# Patient Record
Sex: FEMALE | Race: WHITE | Employment: UNEMPLOYED | URBAN - NONMETROPOLITAN AREA
[De-identification: names, ages, dates, MRNs, and addresses within clinical notes are randomized per-mention and may not be internally consistent; named-entity substitution may affect disease eponyms.]

---

## 2022-04-22 ENCOUNTER — HOSPITAL ENCOUNTER (EMERGENCY)
Age: 22
Discharge: HOME | End: 2022-04-23
Attending: EMERGENCY MEDICINE

## 2022-04-22 DIAGNOSIS — K80.20 CALCULUS OF GALLBLADDER WITHOUT CHOLECYSTITIS WITHOUT OBSTRUCTION: Primary | ICD-10-CM

## 2022-04-22 DIAGNOSIS — R10.11 ABDOMINAL PAIN, RIGHT UPPER QUADRANT: ICD-10-CM

## 2022-04-22 LAB
ABSOLUTE EOS #: 0.33 K/UL (ref 0–0.44)
ABSOLUTE IMMATURE GRANULOCYTE: <0.03 K/UL (ref 0–0.3)
ABSOLUTE LYMPH #: 2.03 K/UL (ref 1.1–3.7)
ABSOLUTE MONO #: 0.59 K/UL (ref 0.1–1.2)
ALBUMIN SERPL-MCNC: 4.6 G/DL (ref 3.5–5.2)
ALBUMIN/GLOBULIN RATIO: 1.5 (ref 1–2.5)
ALP BLD-CCNC: 70 U/L (ref 35–104)
ALT SERPL-CCNC: <5 U/L (ref 5–33)
ANION GAP SERPL CALCULATED.3IONS-SCNC: 11 MMOL/L (ref 9–17)
AST SERPL-CCNC: 20 U/L
BASOPHILS # BLD: 0 % (ref 0–2)
BASOPHILS ABSOLUTE: 0.03 K/UL (ref 0–0.2)
BILIRUB SERPL-MCNC: 0.27 MG/DL (ref 0.3–1.2)
BUN BLDV-MCNC: 12 MG/DL (ref 6–20)
BUN/CREAT BLD: 25 (ref 9–20)
CALCIUM SERPL-MCNC: 9.1 MG/DL (ref 8.6–10.4)
CHLORIDE BLD-SCNC: 106 MMOL/L (ref 98–107)
CO2: 22 MMOL/L (ref 20–31)
CREAT SERPL-MCNC: 0.48 MG/DL (ref 0.5–0.9)
D-DIMER QUANTITATIVE: 1.1 MG/L FEU (ref 0–0.59)
EOSINOPHILS RELATIVE PERCENT: 4 % (ref 1–4)
GFR AFRICAN AMERICAN: >60 ML/MIN
GFR NON-AFRICAN AMERICAN: >60 ML/MIN
GFR SERPL CREATININE-BSD FRML MDRD: ABNORMAL ML/MIN/{1.73_M2}
GLUCOSE BLD-MCNC: 107 MG/DL (ref 70–99)
HCT VFR BLD CALC: 37.3 % (ref 36.3–47.1)
HEMOGLOBIN: 12.6 G/DL (ref 11.9–15.1)
IMMATURE GRANULOCYTES: 0 %
LIPASE: 20 U/L (ref 13–60)
LYMPHOCYTES # BLD: 25 % (ref 24–43)
MCH RBC QN AUTO: 27.8 PG (ref 25.2–33.5)
MCHC RBC AUTO-ENTMCNC: 33.8 G/DL (ref 25.2–33.5)
MCV RBC AUTO: 82.3 FL (ref 82.6–102.9)
MONOCYTES # BLD: 7 % (ref 3–12)
NRBC AUTOMATED: 0 PER 100 WBC
PDW BLD-RTO: 13.1 % (ref 11.8–14.4)
PLATELET # BLD: 208 K/UL (ref 138–453)
PMV BLD AUTO: 10.7 FL (ref 8.1–13.5)
POTASSIUM SERPL-SCNC: 3.6 MMOL/L (ref 3.7–5.3)
RBC # BLD: 4.53 M/UL (ref 3.95–5.11)
RBC # BLD: ABNORMAL 10*6/UL
SEG NEUTROPHILS: 64 % (ref 36–65)
SEGMENTED NEUTROPHILS ABSOLUTE COUNT: 4.99 K/UL (ref 1.5–8.1)
SODIUM BLD-SCNC: 139 MMOL/L (ref 135–144)
TOTAL PROTEIN: 7.7 G/DL (ref 6.4–8.3)
TROPONIN, HIGH SENSITIVITY: <6 NG/L (ref 0–14)
WBC # BLD: 8 K/UL (ref 3.5–11.3)

## 2022-04-22 PROCEDURE — 80053 COMPREHEN METABOLIC PANEL: CPT

## 2022-04-22 PROCEDURE — 96375 TX/PRO/DX INJ NEW DRUG ADDON: CPT

## 2022-04-22 PROCEDURE — 6370000000 HC RX 637 (ALT 250 FOR IP): Performed by: EMERGENCY MEDICINE

## 2022-04-22 PROCEDURE — 36415 COLL VENOUS BLD VENIPUNCTURE: CPT

## 2022-04-22 PROCEDURE — 96374 THER/PROPH/DIAG INJ IV PUSH: CPT

## 2022-04-22 PROCEDURE — 93005 ELECTROCARDIOGRAM TRACING: CPT | Performed by: EMERGENCY MEDICINE

## 2022-04-22 PROCEDURE — 6360000002 HC RX W HCPCS: Performed by: EMERGENCY MEDICINE

## 2022-04-22 PROCEDURE — 85379 FIBRIN DEGRADATION QUANT: CPT

## 2022-04-22 PROCEDURE — 81001 URINALYSIS AUTO W/SCOPE: CPT

## 2022-04-22 PROCEDURE — 81025 URINE PREGNANCY TEST: CPT

## 2022-04-22 PROCEDURE — 85025 COMPLETE CBC W/AUTO DIFF WBC: CPT

## 2022-04-22 PROCEDURE — 2580000003 HC RX 258: Performed by: EMERGENCY MEDICINE

## 2022-04-22 PROCEDURE — 99285 EMERGENCY DEPT VISIT HI MDM: CPT

## 2022-04-22 PROCEDURE — 83690 ASSAY OF LIPASE: CPT

## 2022-04-22 PROCEDURE — 84484 ASSAY OF TROPONIN QUANT: CPT

## 2022-04-22 RX ORDER — KETOROLAC TROMETHAMINE 30 MG/ML
15 INJECTION, SOLUTION INTRAMUSCULAR; INTRAVENOUS ONCE
Status: COMPLETED | OUTPATIENT
Start: 2022-04-22 | End: 2022-04-22

## 2022-04-22 RX ORDER — ONDANSETRON 2 MG/ML
4 INJECTION INTRAMUSCULAR; INTRAVENOUS ONCE
Status: COMPLETED | OUTPATIENT
Start: 2022-04-22 | End: 2022-04-22

## 2022-04-22 RX ORDER — 0.9 % SODIUM CHLORIDE 0.9 %
1000 INTRAVENOUS SOLUTION INTRAVENOUS ONCE
Status: COMPLETED | OUTPATIENT
Start: 2022-04-22 | End: 2022-04-23

## 2022-04-22 RX ADMIN — ONDANSETRON 4 MG: 2 INJECTION INTRAMUSCULAR; INTRAVENOUS at 23:15

## 2022-04-22 RX ADMIN — SODIUM CHLORIDE 1000 ML: 9 INJECTION, SOLUTION INTRAVENOUS at 23:14

## 2022-04-22 RX ADMIN — KETOROLAC TROMETHAMINE 15 MG: 30 INJECTION, SOLUTION INTRAMUSCULAR at 23:15

## 2022-04-22 RX ADMIN — LIDOCAINE HYDROCHLORIDE: 20 SOLUTION ORAL; TOPICAL at 23:35

## 2022-04-22 ASSESSMENT — PAIN DESCRIPTION - DESCRIPTORS: DESCRIPTORS: TIGHTNESS

## 2022-04-22 ASSESSMENT — PAIN - FUNCTIONAL ASSESSMENT: PAIN_FUNCTIONAL_ASSESSMENT: 0-10

## 2022-04-22 ASSESSMENT — PAIN SCALES - GENERAL
PAINLEVEL_OUTOF10: 10
PAINLEVEL_OUTOF10: 9

## 2022-04-22 ASSESSMENT — PAIN DESCRIPTION - LOCATION: LOCATION: ABDOMEN;CHEST

## 2022-04-23 ENCOUNTER — APPOINTMENT (OUTPATIENT)
Dept: CT IMAGING | Age: 22
End: 2022-04-23

## 2022-04-23 VITALS
BODY MASS INDEX: 36.32 KG/M2 | OXYGEN SATURATION: 98 % | HEART RATE: 70 BPM | SYSTOLIC BLOOD PRESSURE: 154 MMHG | HEIGHT: 60 IN | WEIGHT: 185 LBS | DIASTOLIC BLOOD PRESSURE: 94 MMHG | RESPIRATION RATE: 16 BRPM | TEMPERATURE: 99 F

## 2022-04-23 LAB
-: ABNORMAL
BACTERIA: ABNORMAL
BILIRUBIN URINE: NEGATIVE
EPITHELIAL CELLS UA: ABNORMAL /HPF (ref 0–5)
GLUCOSE URINE: NEGATIVE
HCG(URINE) PREGNANCY TEST: NEGATIVE
KETONES, URINE: NEGATIVE
LEUKOCYTE ESTERASE, URINE: ABNORMAL
NITRITE, URINE: NEGATIVE
PH UA: 7 (ref 5–6)
PROTEIN UA: NEGATIVE
RBC UA: ABNORMAL /HPF (ref 0–4)
SPECIFIC GRAVITY UA: 1.02 (ref 1.01–1.02)
URINE HGB: ABNORMAL
UROBILINOGEN, URINE: NORMAL
WBC UA: ABNORMAL /HPF (ref 0–4)

## 2022-04-23 PROCEDURE — 71260 CT THORAX DX C+: CPT

## 2022-04-23 PROCEDURE — 6360000004 HC RX CONTRAST MEDICATION: Performed by: EMERGENCY MEDICINE

## 2022-04-23 PROCEDURE — 2709999900 CT ABDOMEN PELVIS W IV CONTRAST

## 2022-04-23 RX ADMIN — IOPAMIDOL 100 ML: 755 INJECTION, SOLUTION INTRAVENOUS at 00:59

## 2022-04-23 ASSESSMENT — ENCOUNTER SYMPTOMS
VOMITING: 0
NAUSEA: 1
ABDOMINAL PAIN: 1
SHORTNESS OF BREATH: 0

## 2022-04-23 ASSESSMENT — PAIN DESCRIPTION - LOCATION: LOCATION: ABDOMEN

## 2022-04-23 ASSESSMENT — PAIN SCALES - GENERAL
PAINLEVEL_OUTOF10: 3
PAINLEVEL_OUTOF10: 1

## 2022-04-23 ASSESSMENT — PAIN - FUNCTIONAL ASSESSMENT: PAIN_FUNCTIONAL_ASSESSMENT: 0-10

## 2022-04-23 ASSESSMENT — PAIN DESCRIPTION - DESCRIPTORS: DESCRIPTORS: TIGHTNESS

## 2022-04-23 ASSESSMENT — PAIN DESCRIPTION - ORIENTATION: ORIENTATION: RIGHT

## 2022-04-23 NOTE — ED PROVIDER NOTES
[facility]  [address]  Phone: [phone]  eMERGENCY dEPARTMENT eNCOUnter      Pt Name: Rozina Pierce  MRN: [id number]  [place] 2000  Date of evaluation: 4/23/22      CHIEF COMPLAINT     Chief Complaint   Patient presents with    Chest Pain    Abdominal Pain       HISTORY OF PRESENT ILLNESS    Rozina Pierce is a 25 y.o. female who presents today for abdominal pain and chest pain. Patient indicates when she woke up this morning she did not feel quite herself. She states that at approximately 3:30 in the afternoon she developed significant abdominal pain shortly after eating. She did go home and was able to have a bowel movement and felt somewhat better and then she had an exacerbation of symptoms as well as development of right-sided chest pain started 2 hours ago. She describes it is in the mid to right chest.  She also has associated epigastric pain. She describes the symptoms as sharp and tight. No associated cough or shortness of breath. Denies OCPs. No previous abdominal surgeries. Denies other hormones. No recent surgeries prolonged travel calf tenderness or swelling. Patient does indicate a family history of gallbladder disease. No family history of premature cardiovascular disease. REVIEW OF SYSTEMS     Review of Systems   Constitutional: Negative for fever. HENT: Negative for congestion. Respiratory: Negative for shortness of breath. Cardiovascular: Positive for chest pain. Gastrointestinal: Positive for abdominal pain and nausea. Negative for vomiting. Genitourinary: Negative for dysuria. Skin: Negative for rash. Neurological: Negative for syncope. Psychiatric/Behavioral: Negative for confusion. All other systems reviewed and are negative. PAST MEDICAL HISTORY    has no past medical history on file. SURGICAL HISTORY      has no past surgical history on file.     CURRENT MEDICATIONS     There are no discharge medications for this patient. ALLERGIES     has No Known Allergies. FAMILY HISTORY     has no family status information on file. family history is not on file. SOCIAL HISTORY      reports that she has never smoked. She has never used smokeless tobacco.    PHYSICAL EXAM     INITIAL VITALS:  height is 5' (1.524 m) and weight is 185 lb (83.9 kg). Her tympanic temperature is 99 °F (37.2 °C). Her blood pressure is 154/94 (abnormal) and her pulse is 70. Her respiration is 16 and oxygen saturation is 98%. Physical Exam  Vitals reviewed. Constitutional:       General: She is in acute distress. Appearance: She is not ill-appearing, toxic-appearing or diaphoretic. HENT:      Head: Normocephalic and atraumatic. Eyes:      Extraocular Movements: Extraocular movements intact. Pupils: Pupils are equal, round, and reactive to light. Cardiovascular:      Rate and Rhythm: Normal rate and regular rhythm. Heart sounds: Normal heart sounds. No murmur heard. Comments: Heart rate high normal.  Pulmonary:      Effort: Pulmonary effort is normal. No respiratory distress. Breath sounds: Normal breath sounds. Abdominal:      Tenderness: There is abdominal tenderness in the right upper quadrant. There is no guarding or rebound. Positive signs include George's sign. Negative signs include McBurney's sign. Skin:     General: Skin is warm and dry. Capillary Refill: Capillary refill takes less than 2 seconds. Findings: No rash. Neurological:      General: No focal deficit present. Mental Status: She is alert and oriented to person, place, and time. Psychiatric:         Mood and Affect: Mood normal.         Behavior: Behavior normal.       DIFFERENTIAL DIAGNOSIS / MDM / EMERGENCY DEPARTMENT COURSE:     Given patient's symptoms and heart rate I will obtain a D-dimer to screen for pulmonary embolism however patient symptoms may also be abdominal in origin.   Will obtain intervals normal axis. No acute ST elevations or depressions but nonspecific ST-T wave changes. Interpretation is a nonacute twelve-lead EKG. RADIOLOGY:   Radiologist interpretation of radiologic studies:     CT ABDOMEN PELVIS W IV CONTRAST Additional Contrast? None    Result Date: 4/23/2022  EXAMINATION: CTA OF THE CHEST; CT OF THE ABDOMEN AND PELVIS WITH CONTRAST 4/23/2022 12:51 am TECHNIQUE: CTA of the chest was performed after the administration of intravenous contrast.  Multiplanar reformatted images are provided for review. MIP images are provided for review. Dose modulation, iterative reconstruction, and/or weight based adjustment of the mA/kV was utilized to reduce the radiation dose to as low as reasonably achievable.; CT of the abdomen and pelvis was performed with the administration of intravenous contrast. Multiplanar reformatted images are provided for review. Dose modulation, iterative reconstruction, and/or weight based adjustment of the mA/kV was utilized to reduce the radiation dose to as low as reasonably achievable. COMPARISON: None. HISTORY: ORDERING SYSTEM PROVIDED HISTORY: chest pain / abd pain / elevated dimer TECHNOLOGIST PROVIDED HISTORY: chest pain / abd pain / elevated dimer Decision Support Exception - unselect if not a suspected or confirmed emergency medical condition->Emergency Medical Condition (MA) Reason for Exam: elevated ddimer,  mid abd pain FINDINGS: Chest: Pulmonary Arteries: Pulmonary arteries are adequately opacified for evaluation. No evidence of intraluminal filling defect to suggest pulmonary embolism. Main pulmonary artery is normal in caliber. Mediastinum: No evidence of mediastinal lymphadenopathy. The heart and pericardium demonstrate no acute abnormality. There is no acute abnormality of the thoracic aorta. Lungs/pleura: The lungs are without acute process. No focal consolidation or pulmonary edema. Minimal bibasilar dependent atelectasis.   No evidence of pleural effusion or pneumothorax. Soft Tissues/Bones: No acute osseous abnormality in the thoracic cage. Abdomen pelvis: Organs: Multiple stones in the gallbladder in, including the neck. The largest stone measures 1.2 cm. Gallbladder wall edema is seen. None unremarkable liver, spleen, pancreas, adrenals, and left kidney. A 1.1 cm simple cyst in the lateral cortex of the right kidney, likely a simple cyst but too small to fully characterize. GI/Bowel: Normal appendix. Bowel loops nonobstructed. Pelvis: Low lying and rotated IUD which is imbedded in the myometrium of the lower uterine segment. No adnexal mass. The urinary bladder grossly intact. Peritoneum/Retroperitoneum: No free air or free fluid. No adenopathy. Intact abdominal aorta and its major branches. Bones/Soft Tissues: No acute osseous abnormality. No evidence of pulmonary embolism or acute pulmonary abnormality. Cholelithiasis with gallbladder wall edema. HIDA scan may be obtained to evaluate for possible acute cholecystitis if clinically indicated. Low lying and rotated IUD which is imbedded in the myometrium of the lower uterine segment. Clinical correlation recommended. CT CHEST PULMONARY EMBOLISM W CONTRAST    Result Date: 4/23/2022  EXAMINATION: CTA OF THE CHEST; CT OF THE ABDOMEN AND PELVIS WITH CONTRAST 4/23/2022 12:51 am TECHNIQUE: CTA of the chest was performed after the administration of intravenous contrast.  Multiplanar reformatted images are provided for review. MIP images are provided for review. Dose modulation, iterative reconstruction, and/or weight based adjustment of the mA/kV was utilized to reduce the radiation dose to as low as reasonably achievable.; CT of the abdomen and pelvis was performed with the administration of intravenous contrast. Multiplanar reformatted images are provided for review.  Dose modulation, iterative reconstruction, and/or weight based adjustment of the mA/kV was utilized to reduce the radiation dose to as low as reasonably achievable. COMPARISON: None. HISTORY: ORDERING SYSTEM PROVIDED HISTORY: chest pain / abd pain / elevated dimer TECHNOLOGIST PROVIDED HISTORY: chest pain / abd pain / elevated dimer Decision Support Exception - unselect if not a suspected or confirmed emergency medical condition->Emergency Medical Condition (MA) Reason for Exam: elevated ddimer,  mid abd pain FINDINGS: Chest: Pulmonary Arteries: Pulmonary arteries are adequately opacified for evaluation. No evidence of intraluminal filling defect to suggest pulmonary embolism. Main pulmonary artery is normal in caliber. Mediastinum: No evidence of mediastinal lymphadenopathy. The heart and pericardium demonstrate no acute abnormality. There is no acute abnormality of the thoracic aorta. Lungs/pleura: The lungs are without acute process. No focal consolidation or pulmonary edema. Minimal bibasilar dependent atelectasis. No evidence of pleural effusion or pneumothorax. Soft Tissues/Bones: No acute osseous abnormality in the thoracic cage. Abdomen pelvis: Organs: Multiple stones in the gallbladder in, including the neck. The largest stone measures 1.2 cm. Gallbladder wall edema is seen. None unremarkable liver, spleen, pancreas, adrenals, and left kidney. A 1.1 cm simple cyst in the lateral cortex of the right kidney, likely a simple cyst but too small to fully characterize. GI/Bowel: Normal appendix. Bowel loops nonobstructed. Pelvis: Low lying and rotated IUD which is imbedded in the myometrium of the lower uterine segment. No adnexal mass. The urinary bladder grossly intact. Peritoneum/Retroperitoneum: No free air or free fluid. No adenopathy. Intact abdominal aorta and its major branches. Bones/Soft Tissues: No acute osseous abnormality. No evidence of pulmonary embolism or acute pulmonary abnormality. Cholelithiasis with gallbladder wall edema.   HIDA scan may be obtained to evaluate for possible acute cholecystitis if clinically indicated. Low lying and rotated IUD which is imbedded in the myometrium of the lower uterine segment. Clinical correlation recommended.        LABS:  Results for orders placed or performed during the hospital encounter of 04/22/22   CBC with Auto Differential   Result Value Ref Range    WBC 8.0 3.5 - 11.3 k/uL    RBC 4.53 3.95 - 5.11 m/uL    Hemoglobin 12.6 11.9 - 15.1 g/dL    Hematocrit 37.3 36.3 - 47.1 %    MCV 82.3 (L) 82.6 - 102.9 fL    MCH 27.8 25.2 - 33.5 pg    MCHC 33.8 (H) 25.2 - 33.5 g/dL    RDW 13.1 11.8 - 14.4 %    Platelets 594 443 - 310 k/uL    MPV 10.7 8.1 - 13.5 fL    NRBC Automated 0.0 0.0 per 100 WBC    Seg Neutrophils 64 36 - 65 %    Lymphocytes 25 24 - 43 %    Monocytes 7 3 - 12 %    Eosinophils % 4 1 - 4 %    Basophils 0 0 - 2 %    Immature Granulocytes 0 0 %    Segs Absolute 4.99 1.50 - 8.10 k/uL    Absolute Lymph # 2.03 1.10 - 3.70 k/uL    Absolute Mono # 0.59 0.10 - 1.20 k/uL    Absolute Eos # 0.33 0.00 - 0.44 k/uL    Basophils Absolute 0.03 0.00 - 0.20 k/uL    Absolute Immature Granulocyte <0.03 0.00 - 0.30 k/uL    RBC Morphology MICROCYTOSIS PRESENT    Comprehensive Metabolic Panel   Result Value Ref Range    Glucose 107 (H) 70 - 99 mg/dL    BUN 12 6 - 20 mg/dL    CREATININE 0.48 (L) 0.50 - 0.90 mg/dL    Bun/Cre Ratio 25 (H) 9 - 20    Calcium 9.1 8.6 - 10.4 mg/dL    Sodium 139 135 - 144 mmol/L    Potassium 3.6 (L) 3.7 - 5.3 mmol/L    Chloride 106 98 - 107 mmol/L    CO2 22 20 - 31 mmol/L    Anion Gap 11 9 - 17 mmol/L    Alkaline Phosphatase 70 35 - 104 U/L    ALT <5 (L) 5 - 33 U/L    AST 20 <32 U/L    Total Bilirubin 0.27 (L) 0.3 - 1.2 mg/dL    Total Protein 7.7 6.4 - 8.3 g/dL    Albumin 4.6 3.5 - 5.2 g/dL    Albumin/Globulin Ratio 1.5 1.0 - 2.5    GFR Non-African American >60 >60 mL/min    GFR African American >60 >60 mL/min    GFR Comment         Lipase   Result Value Ref Range    Lipase 20 13 - 60 U/L   D-Dimer, Quantitative   Result Value Ref Range    D-Dimer, Quant 1.10 (H) 0.00 - 0.59 mg/L FEU   Troponin   Result Value Ref Range    Troponin, High Sensitivity <6 0 - 14 ng/L   Urinalysis with Reflex to Culture    Specimen: Urine, clean catch   Result Value Ref Range    Glucose, Ur NEGATIVE NEGATIVE    Bilirubin Urine NEGATIVE NEGATIVE    Ketones, Urine NEGATIVE NEGATIVE    Specific Gravity, UA 1.025 1.010 - 1.025    Urine Hgb 1+ (A) NEGATIVE    pH, UA 7.0 (H) 5.0 - 6.0    Protein, UA NEGATIVE NEGATIVE    Urobilinogen, Urine Normal Normal    Nitrite, Urine NEGATIVE NEGATIVE    Leukocyte Esterase, Urine TRACE (A) NEGATIVE   Pregnancy, Urine   Result Value Ref Range    HCG(Urine) Pregnancy Test NEGATIVE NEGATIVE   Microscopic Urinalysis   Result Value Ref Range    -          WBC, UA 5 TO 10 0 - 4 /HPF    RBC, UA 5 TO 10 0 - 4 /HPF    Epithelial Cells UA 25 TO 50 0 - 5 /HPF    Bacteria, UA 2+ (A) None   EKG 12 Lead   Result Value Ref Range    Ventricular Rate 95 BPM    Atrial Rate 95 BPM    P-R Interval 132 ms    QRS Duration 82 ms    Q-T Interval 368 ms    QTc Calculation (Bazett) 462 ms    P Axis 57 degrees    R Axis 89 degrees    T Axis -3 degrees       EMERGENCY DEPARTMENT COURSE:   Vitals:    Vitals:    04/23/22 0109 04/23/22 0115 04/23/22 0130 04/23/22 0301   BP:  137/86 (!) 138/96 (!) 154/94   Pulse: 73 95 80 70   Resp: 18 18 18 16   Temp:       TempSrc:       SpO2: 98% 99% 96% 98%   Weight:       Height:         -------------------------  BP: (!) 154/94, Temp: 99 °F (37.2 °C), Pulse: 70, Resp: 16    CONSULTS:  General surgery    PROCEDURES:  None    FINAL IMPRESSION      1. Calculus of gallbladder without cholecystitis without obstruction    2.  Abdominal pain, right upper quadrant          DISPOSITION/PLAN   DISPOSITION Decision To Discharge 04/23/2022 02:55:41 AM      PATIENT REFERRED TO:  Nayan Garcia MD  [facility]. [id number] [phone]    In 2 days        DISCHARGE MEDICATIONS:  There are no discharge medications for this patient. There are no active hospital problems to display for this patient.       (Please note that portions of this note were completed with avoice recognition program.  Efforts were made to edit the dictations but occasionally words are mis-transcribed.)    Maki Voss MD, [facility]  Attending Emergency Medicine Physician       Maki Voss MD  04/23/22 9208

## 2022-04-24 LAB
EKG ATRIAL RATE: 95 BPM
EKG P AXIS: 57 DEGREES
EKG P-R INTERVAL: 132 MS
EKG Q-T INTERVAL: 368 MS
EKG QRS DURATION: 82 MS
EKG QTC CALCULATION (BAZETT): 462 MS
EKG R AXIS: 89 DEGREES
EKG T AXIS: -3 DEGREES
EKG VENTRICULAR RATE: 95 BPM

## 2022-05-03 ENCOUNTER — INITIAL CONSULT (OUTPATIENT)
Dept: SURGERY | Age: 22
End: 2022-05-03
Payer: COMMERCIAL

## 2022-05-03 ENCOUNTER — TELEPHONE (OUTPATIENT)
Dept: SURGERY | Age: 22
End: 2022-05-03

## 2022-05-03 VITALS
WEIGHT: 200.2 LBS | SYSTOLIC BLOOD PRESSURE: 110 MMHG | TEMPERATURE: 96.3 F | DIASTOLIC BLOOD PRESSURE: 80 MMHG | HEIGHT: 60 IN | BODY MASS INDEX: 39.3 KG/M2 | OXYGEN SATURATION: 97 % | HEART RATE: 84 BPM

## 2022-05-03 DIAGNOSIS — K80.20 SYMPTOMATIC CHOLELITHIASIS: Primary | ICD-10-CM

## 2022-05-03 PROCEDURE — 99204 OFFICE O/P NEW MOD 45 MIN: CPT | Performed by: SURGERY

## 2022-05-03 PROCEDURE — 99215 OFFICE O/P EST HI 40 MIN: CPT | Performed by: SURGERY

## 2022-05-03 NOTE — ASSESSMENT & PLAN NOTE
Patient is having symptoms and does have a imaging consistent with symptomatic cholelithiasis versus acute on chronic cholecystitis. I discussed the pathology with the patient and we discussed options including surgical options. Patient would like to proceed with surgery. Have proposed robotic assisted laparoscopic cholecystectomy. Risks of the procedure including bleeding, infection, scarring, pain, damage surrounding structures including bile ducts, need for additional surgery, retained stone, bile leak, conversion open and anesthesia risk were discussed and consent is obtained. Have advised patient to adhere to a low-fat bland diet for now to try and prevent any significant symptoms. Also discussed that should she have any recurrence of major symptoms that are not resolving after 3 to 4 hours she should come to the ER for recheck.

## 2022-05-03 NOTE — PROGRESS NOTES
Subjective   Armando Chaudhary is a 25 y.o. female who presents today for further evaluation of symptomatic cholelithiasis. Patient reports that about a week and 1/2 to 2 weeks ago she began to develop upper abdominal pain after eating skillet meal.  Initially she thought this was just related to indigestion or some sort of intestinal issue that began to have radiation of the pain up into her lower chest along with some nausea. Because of the radiating pain she came to ER for evaluation. She did have a work-up that showed fairly unremarkable labs but a CT scan did demonstrate gallstones with some mild inflammatory changes in the right upper quadrant. Since then the patient states that she has had a couple more episodes though they have not been as severe as that initial episode. Was referred to general surgery for further evaluation and due to cholelithiasis. Denies any changes in bowel movements. Is still tolerating diet. No other complaints. History reviewed. No pertinent past medical history. History reviewed. No pertinent surgical history. No current outpatient medications on file. No current facility-administered medications for this visit.        No Known Allergies    Family History   Problem Relation Age of Onset    Glaucoma Neg Hx        Social History     Socioeconomic History    Marital status: Single     Spouse name: Not on file    Number of children: Not on file    Years of education: Not on file    Highest education level: Not on file   Occupational History    Not on file   Tobacco Use    Smoking status: Never Smoker    Smokeless tobacco: Never Used   Vaping Use    Vaping Use: Never used   Substance and Sexual Activity    Alcohol use: Not Currently    Drug use: Not on file    Sexual activity: Not on file   Other Topics Concern    Not on file   Social History Narrative    Not on file     Social Determinants of Health     Financial Resource Strain:     Difficulty of Paying Living Expenses: Not on file   Food Insecurity:     Worried About [address] [employer] in the Last Year: Not on file    Solomon of Food in the Last Year: Not on file   Transportation Needs:     Lack of Transportation (Medical): Not on file    Lack of Transportation (Non-Medical): Not on file   Physical Activity:     Days of Exercise per Week: Not on file    Minutes of Exercise per Session: Not on file   Stress:     Feeling of Stress : Not on file   Social Connections:     Frequency of Communication with Friends and Family: Not on file    Frequency of Social Gatherings with Friends and Family: Not on file    Attends Baptism Services: Not on file    Active Member of [address] [place] [address] or Organizations: Not on file    Attends Club or Organization Meetings: Not on file    Marital Status: Not on file   Intimate Partner Violence:     Fear of Current or Ex-Partner: Not on file    Emotionally Abused: Not on file    Physically Abused: Not on file    Sexually Abused: Not on file   Housing Stability:     Unable to Pay for Housing in the Last Year: Not on file    Number of [place] in the Last Year: Not on file    Unstable Housing in the Last Year: Not on file       ROS:   Review of Systems - General ROS: negative  Psychological ROS: negative  Ophthalmic ROS: negative  ENT ROS: negative  Respiratory ROS: no cough, shortness of breath, or wheezing  Cardiovascular ROS: no chest pain or dyspnea on exertion  Gastrointestinal ROS: per HPI  Genito-Urinary ROS: no dysuria, trouble voiding, or hematuria  Musculoskeletal ROS: negative  Dermatological ROS: negative      Objective   Vitals:    05/03/22 1400   BP: 110/80   Pulse: 84   Temp: 96.3 °F (35.7 °C)   SpO2: 97%     General:in no apparent distress and well developed and well nourished  Eyes: No gross abnormalities.   Ears, Nose, Throat: hearing grossly normal bilaterally  Neck: neck supple and non tender without mass  Lungs: clear to auscultation without wheezes or rales Heart: S1S2, no mumurs, RRR  Abdomen: Soft, nondistended, mild tenderness palpation in right upper quadrant, bowel sounds present. Extremity: negative  Neuro: CN II-XII grossly intact      1. Symptomatic cholelithiasis  Assessment & Plan:  Patient is having symptoms and does have a imaging consistent with symptomatic cholelithiasis versus acute on chronic cholecystitis. I discussed the pathology with the patient and we discussed options including surgical options. Patient would like to proceed with surgery. Have proposed robotic assisted laparoscopic cholecystectomy. Risks of the procedure including bleeding, infection, scarring, pain, damage surrounding structures including bile ducts, need for additional surgery, retained stone, bile leak, conversion open and anesthesia risk were discussed and consent is obtained. Have advised patient to adhere to a low-fat bland diet for now to try and prevent any significant symptoms. Also discussed that should she have any recurrence of major symptoms that are not resolving after 3 to 4 hours she should come to the ER for recheck.          (Please note that portions of this note were completed with a voice recognition program.  Efforts were made to edit the dictations but occasionally words are mis-transcribed.)

## 2022-05-12 RX ORDER — SODIUM CHLORIDE 9 MG/ML
INJECTION, SOLUTION INTRAVENOUS PRN
Status: CANCELLED | OUTPATIENT
Start: 2022-05-12

## 2022-05-12 RX ORDER — OXYCODONE HYDROCHLORIDE 5 MG/1
10 TABLET ORAL PRN
Status: CANCELLED | OUTPATIENT
Start: 2022-05-12 | End: 2022-05-12

## 2022-05-12 RX ORDER — OXYCODONE HYDROCHLORIDE 5 MG/1
5 TABLET ORAL PRN
Status: CANCELLED | OUTPATIENT
Start: 2022-05-12 | End: 2022-05-12

## 2022-05-12 RX ORDER — ONDANSETRON 2 MG/ML
4 INJECTION INTRAMUSCULAR; INTRAVENOUS
Status: CANCELLED | OUTPATIENT
Start: 2022-05-12 | End: 2022-05-12

## 2022-05-12 RX ORDER — MORPHINE SULFATE 2 MG/ML
1 INJECTION, SOLUTION INTRAMUSCULAR; INTRAVENOUS EVERY 5 MIN PRN
Status: CANCELLED | OUTPATIENT
Start: 2022-05-12

## 2022-05-12 RX ORDER — MORPHINE SULFATE 2 MG/ML
2 INJECTION, SOLUTION INTRAMUSCULAR; INTRAVENOUS EVERY 5 MIN PRN
Status: CANCELLED | OUTPATIENT
Start: 2022-05-12

## 2022-05-12 RX ORDER — SODIUM CHLORIDE 0.9 % (FLUSH) 0.9 %
5-40 SYRINGE (ML) INJECTION EVERY 12 HOURS SCHEDULED
Status: CANCELLED | OUTPATIENT
Start: 2022-05-12

## 2022-05-12 RX ORDER — SODIUM CHLORIDE 0.9 % (FLUSH) 0.9 %
5-40 SYRINGE (ML) INJECTION PRN
Status: CANCELLED | OUTPATIENT
Start: 2022-05-12

## 2022-05-12 NOTE — H&P
Subjective   Cherylene Sauger is a 25 y.o. female who presents today for further evaluation of symptomatic cholelithiasis. Patient reports that about a week and 1/2 to 2 weeks ago she began to develop upper abdominal pain after eating skillet meal.  Initially she thought this was just related to indigestion or some sort of intestinal issue that began to have radiation of the pain up into her lower chest along with some nausea. Because of the radiating pain she came to ER for evaluation. She did have a work-up that showed fairly unremarkable labs but a CT scan did demonstrate gallstones with some mild inflammatory changes in the right upper quadrant. Since then the patient states that she has had a couple more episodes though they have not been as severe as that initial episode. Was referred to general surgery for further evaluation and due to cholelithiasis. Denies any changes in bowel movements. Is still tolerating diet. No other complaints.     Past Medical History   History reviewed. No pertinent past medical history.        Past Surgical History   History reviewed.  No pertinent surgical history.        Current Facility-Administered Medications   No current outpatient medications on file.      No current facility-administered medications for this visit.            No Known Allergies     Family History         Family History   Problem Relation Age of Onset    Glaucoma Neg Hx              Social History               Socioeconomic History    Marital status: Single       Spouse name: Not on file    Number of children: Not on file    Years of education: Not on file    Highest education level: Not on file   Occupational History    Not on file   Tobacco Use    Smoking status: Never Smoker    Smokeless tobacco: Never Used   Vaping Use    Vaping Use: Never used   Substance and Sexual Activity    Alcohol use: Not Currently    Drug use: Not on file    Sexual activity: Not on file   Other Topics Concern    Not on file   Social History Narrative    Not on file      Social Determinants of Health          Financial Resource Strain:     Difficulty of Paying Living Expenses: Not on file   Food Insecurity:     Worried About [address] [employer] in the Last Year: Not on file    Solomon of Food in the Last Year: Not on file   Transportation Needs:     Lack of Transportation (Medical): Not on file    Lack of Transportation (Non-Medical):  Not on file   Physical Activity:     Days of Exercise per Week: Not on file    Minutes of Exercise per Session: Not on file   Stress:     Feeling of Stress : Not on file   Social Connections:     Frequency of Communication with Friends and Family: Not on file    Frequency of Social Gatherings with Friends and Family: Not on file    Attends Mandaeism Services: Not on file    Active Member of [address] [place] [address] or Organizations: Not on file    Attends Club or Organization Meetings: Not on file    Marital Status: Not on file   Intimate Partner Violence:     Fear of Current or Ex-Partner: Not on file    Emotionally Abused: Not on file    Physically Abused: Not on file    Sexually Abused: Not on file   Housing Stability:     Unable to Pay for Housing in the Last Year: Not on file    Number of [place] in the Last Year: Not on file    Unstable Housing in the Last Year: Not on file            ROS:   Review of Systems - General ROS: negative  Psychological ROS: negative  Ophthalmic ROS: negative  ENT ROS: negative  Respiratory ROS: no cough, shortness of breath, or wheezing  Cardiovascular ROS: no chest pain or dyspnea on exertion  Gastrointestinal ROS: per HPI  Genito-Urinary ROS: no dysuria, trouble voiding, or hematuria  Musculoskeletal ROS: negative  Dermatological ROS: negative        Objective       Vitals:     05/03/22 1400   BP: 110/80   Pulse: 84   Temp: 96.3 °F (35.7 °C)   SpO2: 97%      General:in no apparent distress and well developed and well nourished  Eyes: No gross abnormalities. Ears, Nose, Throat: hearing grossly normal bilaterally  Neck: neck supple and non tender without mass  Lungs: clear to auscultation without wheezes or rales   Heart: S1S2, no mumurs, RRR  Abdomen: Soft, nondistended, mild tenderness palpation in right upper quadrant, bowel sounds present. Extremity: negative  Neuro: CN II-XII grossly intact        1. Symptomatic cholelithiasis  Assessment & Plan:  Patient is having symptoms and does have a imaging consistent with symptomatic cholelithiasis versus acute on chronic cholecystitis.     I discussed the pathology with the patient and we discussed options including surgical options. Patient would like to proceed with surgery. Have proposed robotic assisted laparoscopic cholecystectomy. Risks of the procedure including bleeding, infection, scarring, pain, damage surrounding structures including bile ducts, need for additional surgery, retained stone, bile leak, conversion open and anesthesia risk were discussed and consent is obtained.     Have advised patient to adhere to a low-fat bland diet for now to try and prevent any significant symptoms.   Also discussed that should she have any recurrence of major symptoms that are not resolving after 3 to 4 hours she should come to the ER for recheck.           (Please note that portions of this note were completed with a voice recognition program.  Efforts were made to edit the dictations but occasionally words are mis-transcribed.)    The patient was interviewed and examined and there have been no changes since the above History and Physical.    Electronically signed by Veronica Barnett DO on 5/12/2022 at 6:54 PM

## 2022-05-13 ENCOUNTER — ANESTHESIA EVENT (OUTPATIENT)
Dept: OPERATING ROOM | Age: 22
End: 2022-05-13
Payer: COMMERCIAL

## 2022-05-13 ENCOUNTER — HOSPITAL ENCOUNTER (OUTPATIENT)
Age: 22
Setting detail: OUTPATIENT SURGERY
Discharge: HOME | End: 2022-05-13
Attending: SURGERY | Admitting: SURGERY
Payer: COMMERCIAL

## 2022-05-13 ENCOUNTER — ANESTHESIA (OUTPATIENT)
Dept: OPERATING ROOM | Age: 22
End: 2022-05-13
Payer: COMMERCIAL

## 2022-05-13 VITALS
HEIGHT: 60 IN | RESPIRATION RATE: 16 BRPM | BODY MASS INDEX: 39.03 KG/M2 | WEIGHT: 198.8 LBS | HEART RATE: 92 BPM | TEMPERATURE: 97.6 F | DIASTOLIC BLOOD PRESSURE: 69 MMHG | SYSTOLIC BLOOD PRESSURE: 116 MMHG | OXYGEN SATURATION: 95 %

## 2022-05-13 VITALS
TEMPERATURE: 97.8 F | SYSTOLIC BLOOD PRESSURE: 105 MMHG | DIASTOLIC BLOOD PRESSURE: 59 MMHG | RESPIRATION RATE: 21 BRPM | OXYGEN SATURATION: 95 %

## 2022-05-13 DIAGNOSIS — G89.18 POST-OP PAIN: Primary | ICD-10-CM

## 2022-05-13 LAB — HCG, PREGNANCY URINE (POC): NEGATIVE

## 2022-05-13 PROCEDURE — 6370000000 HC RX 637 (ALT 250 FOR IP): Performed by: NURSE ANESTHETIST, CERTIFIED REGISTERED

## 2022-05-13 PROCEDURE — 7100000001 HC PACU RECOVERY - ADDTL 15 MIN: Performed by: SURGERY

## 2022-05-13 PROCEDURE — 2580000003 HC RX 258: Performed by: SURGERY

## 2022-05-13 PROCEDURE — 7100000000 HC PACU RECOVERY - FIRST 15 MIN: Performed by: SURGERY

## 2022-05-13 PROCEDURE — 2500000003 HC RX 250 WO HCPCS: Performed by: SURGERY

## 2022-05-13 PROCEDURE — 3700000001 HC ADD 15 MINUTES (ANESTHESIA): Performed by: SURGERY

## 2022-05-13 PROCEDURE — 2709999900 HC NON-CHARGEABLE SUPPLY: Performed by: SURGERY

## 2022-05-13 PROCEDURE — 7100000010 HC PHASE II RECOVERY - FIRST 15 MIN: Performed by: SURGERY

## 2022-05-13 PROCEDURE — 88304 TISSUE EXAM BY PATHOLOGIST: CPT

## 2022-05-13 PROCEDURE — 2500000003 HC RX 250 WO HCPCS: Performed by: NURSE ANESTHETIST, CERTIFIED REGISTERED

## 2022-05-13 PROCEDURE — 6360000002 HC RX W HCPCS: Performed by: SURGERY

## 2022-05-13 PROCEDURE — 6360000002 HC RX W HCPCS: Performed by: NURSE ANESTHETIST, CERTIFIED REGISTERED

## 2022-05-13 PROCEDURE — S2900 ROBOTIC SURGICAL SYSTEM: HCPCS | Performed by: SURGERY

## 2022-05-13 PROCEDURE — 3600000019 HC SURGERY ROBOT ADDTL 15MIN: Performed by: SURGERY

## 2022-05-13 PROCEDURE — 7100000011 HC PHASE II RECOVERY - ADDTL 15 MIN: Performed by: SURGERY

## 2022-05-13 PROCEDURE — 3600000009 HC SURGERY ROBOT BASE: Performed by: SURGERY

## 2022-05-13 PROCEDURE — 81025 URINE PREGNANCY TEST: CPT

## 2022-05-13 PROCEDURE — 47562 LAPAROSCOPIC CHOLECYSTECTOMY: CPT | Performed by: SURGERY

## 2022-05-13 PROCEDURE — 3700000000 HC ANESTHESIA ATTENDED CARE: Performed by: SURGERY

## 2022-05-13 RX ORDER — HYDROCODONE BITARTRATE AND ACETAMINOPHEN 5; 325 MG/1; MG/1
1 TABLET ORAL EVERY 6 HOURS PRN
Qty: 20 TABLET | Refills: 0 | Status: SHIPPED | OUTPATIENT
Start: 2022-05-13 | End: 2022-05-18

## 2022-05-13 RX ORDER — ONDANSETRON 4 MG/1
4 TABLET, FILM COATED ORAL 3 TIMES DAILY PRN
Qty: 15 TABLET | Refills: 0 | Status: SHIPPED | OUTPATIENT
Start: 2022-05-13

## 2022-05-13 RX ORDER — SCOLOPAMINE TRANSDERMAL SYSTEM 1 MG/1
PATCH, EXTENDED RELEASE TRANSDERMAL PRN
Status: DISCONTINUED | OUTPATIENT
Start: 2022-05-13 | End: 2022-05-13 | Stop reason: SDUPTHER

## 2022-05-13 RX ORDER — SODIUM CHLORIDE, SODIUM LACTATE, POTASSIUM CHLORIDE, CALCIUM CHLORIDE 600; 310; 30; 20 MG/100ML; MG/100ML; MG/100ML; MG/100ML
INJECTION, SOLUTION INTRAVENOUS CONTINUOUS
Status: DISCONTINUED | OUTPATIENT
Start: 2022-05-13 | End: 2022-05-13 | Stop reason: HOSPADM

## 2022-05-13 RX ORDER — ONDANSETRON 2 MG/ML
INJECTION INTRAMUSCULAR; INTRAVENOUS PRN
Status: DISCONTINUED | OUTPATIENT
Start: 2022-05-13 | End: 2022-05-13 | Stop reason: SDUPTHER

## 2022-05-13 RX ORDER — LIDOCAINE HYDROCHLORIDE 20 MG/ML
INJECTION, SOLUTION INFILTRATION; PERINEURAL PRN
Status: DISCONTINUED | OUTPATIENT
Start: 2022-05-13 | End: 2022-05-13 | Stop reason: SDUPTHER

## 2022-05-13 RX ORDER — KETOROLAC TROMETHAMINE 30 MG/ML
INJECTION, SOLUTION INTRAMUSCULAR; INTRAVENOUS PRN
Status: DISCONTINUED | OUTPATIENT
Start: 2022-05-13 | End: 2022-05-13 | Stop reason: SDUPTHER

## 2022-05-13 RX ORDER — ROCURONIUM BROMIDE 10 MG/ML
INJECTION, SOLUTION INTRAVENOUS PRN
Status: DISCONTINUED | OUTPATIENT
Start: 2022-05-13 | End: 2022-05-13 | Stop reason: SDUPTHER

## 2022-05-13 RX ORDER — SODIUM CHLORIDE 9 MG/ML
INJECTION, SOLUTION INTRAVENOUS PRN
Status: DISCONTINUED | OUTPATIENT
Start: 2022-05-13 | End: 2022-05-13 | Stop reason: HOSPADM

## 2022-05-13 RX ORDER — MIDAZOLAM HYDROCHLORIDE 1 MG/ML
INJECTION INTRAMUSCULAR; INTRAVENOUS PRN
Status: DISCONTINUED | OUTPATIENT
Start: 2022-05-13 | End: 2022-05-13 | Stop reason: SDUPTHER

## 2022-05-13 RX ORDER — DEXAMETHASONE SODIUM PHOSPHATE 4 MG/ML
INJECTION, SOLUTION INTRA-ARTICULAR; INTRALESIONAL; INTRAMUSCULAR; INTRAVENOUS; SOFT TISSUE PRN
Status: DISCONTINUED | OUTPATIENT
Start: 2022-05-13 | End: 2022-05-13 | Stop reason: SDUPTHER

## 2022-05-13 RX ORDER — INDOCYANINE GREEN AND WATER 25 MG
2.5 KIT INJECTION ONCE
Status: COMPLETED | OUTPATIENT
Start: 2022-05-13 | End: 2022-05-13

## 2022-05-13 RX ORDER — SODIUM CHLORIDE 0.9 % (FLUSH) 0.9 %
5-40 SYRINGE (ML) INJECTION EVERY 12 HOURS SCHEDULED
Status: DISCONTINUED | OUTPATIENT
Start: 2022-05-13 | End: 2022-05-13 | Stop reason: HOSPADM

## 2022-05-13 RX ORDER — SODIUM CHLORIDE 0.9 % (FLUSH) 0.9 %
5-40 SYRINGE (ML) INJECTION PRN
Status: DISCONTINUED | OUTPATIENT
Start: 2022-05-13 | End: 2022-05-13 | Stop reason: HOSPADM

## 2022-05-13 RX ORDER — EPHEDRINE SULFATE 50 MG/ML
INJECTION, SOLUTION INTRAVENOUS PRN
Status: DISCONTINUED | OUTPATIENT
Start: 2022-05-13 | End: 2022-05-13 | Stop reason: SDUPTHER

## 2022-05-13 RX ORDER — PROPOFOL 10 MG/ML
INJECTION, EMULSION INTRAVENOUS PRN
Status: DISCONTINUED | OUTPATIENT
Start: 2022-05-13 | End: 2022-05-13 | Stop reason: SDUPTHER

## 2022-05-13 RX ADMIN — INDOCYANINE GREEN AND WATER 2.5 MG: KIT at 07:31

## 2022-05-13 RX ADMIN — EPHEDRINE SULFATE 10 MG: 50 INJECTION, SOLUTION INTRAVENOUS at 08:46

## 2022-05-13 RX ADMIN — LIDOCAINE HYDROCHLORIDE 100 MG: 20 INJECTION, SOLUTION INFILTRATION; PERINEURAL at 08:23

## 2022-05-13 RX ADMIN — Medication 1 MG: at 08:18

## 2022-05-13 RX ADMIN — Medication 2000 MG: at 08:28

## 2022-05-13 RX ADMIN — ONDANSETRON 4 MG: 2 INJECTION INTRAMUSCULAR; INTRAVENOUS at 08:17

## 2022-05-13 RX ADMIN — PROPOFOL 150 MG: 10 INJECTION, EMULSION INTRAVENOUS at 08:23

## 2022-05-13 RX ADMIN — SUGAMMADEX 100 MG: 100 INJECTION, SOLUTION INTRAVENOUS at 09:16

## 2022-05-13 RX ADMIN — KETOROLAC TROMETHAMINE 15 MG: 30 INJECTION, SOLUTION INTRAMUSCULAR at 09:11

## 2022-05-13 RX ADMIN — Medication 0.5 MG: at 09:09

## 2022-05-13 RX ADMIN — ROCURONIUM BROMIDE 50 MG: 10 INJECTION, SOLUTION INTRAVENOUS at 08:23

## 2022-05-13 RX ADMIN — SCOPALAMINE 1 PATCH: 1 PATCH, EXTENDED RELEASE TRANSDERMAL at 08:17

## 2022-05-13 RX ADMIN — SODIUM CHLORIDE, POTASSIUM CHLORIDE, SODIUM LACTATE AND CALCIUM CHLORIDE: 600; 310; 30; 20 INJECTION, SOLUTION INTRAVENOUS at 08:13

## 2022-05-13 RX ADMIN — MIDAZOLAM HYDROCHLORIDE 2 MG: 1 INJECTION, SOLUTION INTRAMUSCULAR; INTRAVENOUS at 08:37

## 2022-05-13 RX ADMIN — Medication 0.5 MG: at 08:52

## 2022-05-13 RX ADMIN — DEXAMETHASONE SODIUM PHOSPHATE 4 MG: 4 INJECTION, SOLUTION INTRAMUSCULAR; INTRAVENOUS at 08:33

## 2022-05-13 ASSESSMENT — PULMONARY FUNCTION TESTS
PIF_VALUE: 24
PIF_VALUE: 27
PIF_VALUE: 13
PIF_VALUE: 14
PIF_VALUE: 0
PIF_VALUE: 27
PIF_VALUE: 0
PIF_VALUE: 0
PIF_VALUE: 28
PIF_VALUE: 25
PIF_VALUE: 28
PIF_VALUE: 13
PIF_VALUE: 24
PIF_VALUE: 5
PIF_VALUE: 3
PIF_VALUE: 25
PIF_VALUE: 27
PIF_VALUE: 28
PIF_VALUE: 1
PIF_VALUE: 27
PIF_VALUE: 24
PIF_VALUE: 24
PIF_VALUE: 27
PIF_VALUE: 24
PIF_VALUE: 29
PIF_VALUE: 5
PIF_VALUE: 28
PIF_VALUE: 27
PIF_VALUE: 27
PIF_VALUE: 26
PIF_VALUE: 27
PIF_VALUE: 24
PIF_VALUE: 13
PIF_VALUE: 2
PIF_VALUE: 28
PIF_VALUE: 27
PIF_VALUE: 15
PIF_VALUE: 14
PIF_VALUE: 28
PIF_VALUE: 17
PIF_VALUE: 24
PIF_VALUE: 5
PIF_VALUE: 27
PIF_VALUE: 2
PIF_VALUE: 27
PIF_VALUE: 27
PIF_VALUE: 24
PIF_VALUE: 13
PIF_VALUE: 27
PIF_VALUE: 28
PIF_VALUE: 27
PIF_VALUE: 28
PIF_VALUE: 2
PIF_VALUE: 27
PIF_VALUE: 27
PIF_VALUE: 6
PIF_VALUE: 0
PIF_VALUE: 27
PIF_VALUE: 28
PIF_VALUE: 27
PIF_VALUE: 26
PIF_VALUE: 28

## 2022-05-13 ASSESSMENT — PAIN SCALES - GENERAL
PAINLEVEL_OUTOF10: 0

## 2022-05-13 ASSESSMENT — PAIN - FUNCTIONAL ASSESSMENT
PAIN_FUNCTIONAL_ASSESSMENT: 0-10
PAIN_FUNCTIONAL_ASSESSMENT: NONE - DENIES PAIN

## 2022-05-13 NOTE — ANESTHESIA POSTPROCEDURE EVALUATION
Department of Anesthesiology  Postprocedure Note    Patient: Cherylene Sauger  MRN: [id number]  YOB: 2000  Date of evaluation: 5/13/2022  Time:  10:24 AM     Procedure Summary     Date: 05/13/22 Room / Location: [address] [place] [address]    Anesthesia Start: [address] Anesthesia Stop: 8286    Procedure: Laparoscopic Robotic Assisted Cholecystestomy (N/A ) Diagnosis: (cholelithiasis)    Surgeons: Adam Law DO Responsible Provider: DALLAS Elizondo CRNA    Anesthesia Type: general ASA Status: 2          Anesthesia Type: No value filed. Elida Phase I: Elida Score: 9    Elida Phase II: Elida Score: 9    Last vitals: Reviewed and per EMR flowsheets.        Anesthesia Post Evaluation    Patient location during evaluation: bedside  Patient participation: complete - patient participated  Level of consciousness: awake and alert  Pain score: 0  Airway patency: patent  Nausea & Vomiting: no vomiting and no nausea  Complications: no  Cardiovascular status: blood pressure returned to baseline  Respiratory status: acceptable and spontaneous ventilation  Hydration status: euvolemic

## 2022-05-13 NOTE — ANESTHESIA PRE PROCEDURE
Department of Anesthesiology  Preprocedure Note       Name:  Liz Cyr   Age:  25 y.o.  :  2000                                          MRN:  [id number]         Date:  2022      Surgeon: Zenaida Delcid):  Veronica Barnett DO    Procedure: Procedure(s):  v-Laparoscopic Robotic Assisted Cholecystestomy    Medications prior to admission:   Prior to Admission medications    Medication Sig Start Date End Date Taking? Authorizing Provider   HYDROcodone-acetaminophen (NORCO) 5-325 MG per tablet Take 1 tablet by mouth every 6 hours as needed for Pain for up to 5 days. Intended supply: 5 days. Take lowest dose possible to manage pain 22 Yes Veronica Barnett DO   ondansetron [facility]) 4 MG tablet Take 1 tablet by mouth 3 times daily as needed for Nausea or Vomiting 22  Yes Veronica Barnett DO       Current medications:    Current Facility-Administered Medications   Medication Dose Route Frequency Provider Last Rate Last Admin    lactated ringers infusion   IntraVENous Continuous Veronica Barnett DO        sodium chloride flush 0.9 % injection 5-40 mL  5-40 mL IntraVENous 2 times per day Veronica Barnett DO        sodium chloride flush 0.9 % injection 5-40 mL  5-40 mL IntraVENous PRN Veronica Barnett DO        0.9 % sodium chloride infusion   IntraVENous PRN Veronica Barnett DO        ceFAZolin (ANCEF) 2000 mg in sterile water 20 mL IV syringe  2,000 mg IntraVENous On Call to [address] Rosario DO           Allergies:  No Known Allergies    Problem List:    Patient Active Problem List   Diagnosis Code    Myopia of both eyes with astigmatism H52.13, H52.203    Symptomatic cholelithiasis K80.20       Past Medical History:  History reviewed. No pertinent past medical history. Past Surgical History:  History reviewed. No pertinent surgical history.     Social History:    Social History     Tobacco Use    Smoking status: Never Smoker    Smokeless tobacco: Never Used   Substance Use Topics    Alcohol use: Not Currently                                Counseling given: Not Answered      Vital Signs (Current):   Vitals:    05/13/22 0728   BP: (!) 136/98   Pulse: (!) 16   Resp: 16   Temp: 36.5 °C (97.7 °F)   TempSrc: Temporal   SpO2: 97%   Weight: 198 lb 12.8 oz (90.2 kg)   Height: 5' (1.524 m)                                              BP Readings from Last 3 Encounters:   05/13/22 (!) 136/98   05/03/22 110/80   04/23/22 (!) 154/94       NPO Status: Time of last liquid consumption: 2130                        Time of last solid consumption: 1900                        Date of last liquid consumption: 05/12/22                        Date of last solid food consumption: 05/12/22    BMI:   Wt Readings from Last 3 Encounters:   05/13/22 198 lb 12.8 oz (90.2 kg)   05/03/22 200 lb 3.2 oz (90.8 kg)   04/22/22 185 lb (83.9 kg)     Body mass index is 38.83 kg/m². CBC:   Lab Results   Component Value Date    WBC 8.0 04/22/2022    RBC 4.53 04/22/2022    HGB 12.6 04/22/2022    HCT 37.3 04/22/2022    MCV 82.3 04/22/2022    RDW 13.1 04/22/2022     04/22/2022       CMP:   Lab Results   Component Value Date     04/22/2022    K 3.6 04/22/2022     04/22/2022    CO2 22 04/22/2022    BUN 12 04/22/2022    CREATININE 0.48 04/22/2022    GFRAA >60 04/22/2022    LABGLOM >60 04/22/2022    GLUCOSE 107 04/22/2022    PROT 7.7 04/22/2022    CALCIUM 9.1 04/22/2022    BILITOT 0.27 04/22/2022    ALKPHOS 70 04/22/2022    AST 20 04/22/2022    ALT <5 04/22/2022       POC Tests: No results for input(s): POCGLU, POCNA, POCK, POCCL, POCBUN, POCHEMO, POCHCT in the last 72 hours.     Coags: No results found for: PROTIME, INR, APTT    HCG (If Applicable):   Lab Results   Component Value Date    PREGTESTUR NEGATIVE 04/22/2022    HCG NEGATIVE 05/13/2022        ABGs: No results found for: PHART, PO2ART, TYQ2SUO, PVZ9GEO, BEART, K1RXUFWC     Type & Screen (If Applicable):  No results found for: LABABO, [address]    Drug/Infectious Status (If Applicable):  No results found for: HIV, HEPCAB    COVID-19 Screening (If Applicable): No results found for: COVID19        Anesthesia Evaluation  Patient summary reviewed and Nursing notes reviewed no history of anesthetic complications:   Airway: Mallampati: II  TM distance: >3 FB   Neck ROM: full  Mouth opening: > = 3 FB Dental:          Pulmonary:Negative Pulmonary ROS and normal exam  breath sounds clear to auscultation                             Cardiovascular:Negative CV ROS  Exercise tolerance: good (>4 METS),           Rhythm: regular  Rate: normal                    Neuro/Psych:   Negative Neuro/Psych ROS              GI/Hepatic/Renal: Neg GI/Hepatic/Renal ROS            Endo/Other: Negative Endo/Other ROS                    Abdominal:       Abdomen: soft. Vascular: negative vascular ROS. Other Findings:             Anesthesia Plan      general     ASA 2       Induction: inhalational.    MIPS: Postoperative opioids intended and Prophylactic antiemetics administered. Anesthetic plan and risks discussed with patient. Use of blood products discussed with patient whom consented to blood products.    Plan discussed with surgical team.                  Phylicia Morfin, DALLAS - CRNA   5/13/2022

## 2022-05-13 NOTE — OP NOTE
[facility] [address]                 [address] [place] [address]                                OPERATIVE REPORT    PATIENT NAME: Antoine Barnhart                      :        2000  MED REC NO:   [id number]                             ROOM:  ACCOUNT NO:   [de-identified]                           ADMIT DATE: 2022  PROVIDER:     Danya Arce    DATE OF PROCEDURE:  2022    SURGEON:  Dr. Danya Arce. ASSISTANT:  None. PREOPERATIVE DIAGNOSIS:  Symptomatic cholelithiasis. POSTOPERATIVE DIAGNOSIS:  Symptomatic cholelithiasis. PROCEDURE:  Robotic-assisted laparoscopic cholecystectomy. ANESTHESIA:  General.    ESTIMATED BLOOD LOSS:  5 mL. FLUIDS:  1500 mL of crystalloid. COMPLICATIONS:  None. SPECIMENS:  Gallbladder and contents. INDICATION FOR PROCEDURE:  The patient is a 70-year-old female who  presented to my office with the above preoperative diagnosis. After  evaluation, we discussed management options and she did wish to proceed  with surgery. Prior to the time of the procedure, risks, benefits, and  alternatives were explained to the patient and consent was obtained. DESCRIPTION OF PROCEDURE:  The patient was brought to the operative  suite and placed on the operative table in the supine position. Monitoring devices and SCD cuffs were placed. General endotracheal  anesthesia was induced. After induction of anesthesia, time-out was  performed and correct patient and procedure were verified. The  patient's abdomen was prepped and draped in the sterile fashion. Prior  to the time of incision, the patient received preoperative antibiotics  in accordance with SCIP protocol and also received a dose of indocyanine  green for fluorescence cholangiography during the case. The skin on the  left upper quadrant approximately 3 to 4 cm below the costal margin at  midclavicular line was anesthetized with local anesthetic. Small  transverse incision was made through this area. Veress needle was  advanced into the abdominal cavity. Saline drop test showed no  aspiration of blood or enteric fluid and free flow of saline. Insufflation tubing was connected and the patient's abdomen was  insufflated to 12 mmHg. Once done, the Veress needle was removed and an  8 mm robotic trocar with laparoscope in place was advanced into the  abdomen in an Optiview fashion. Once in, inspection of underlying  structures showed no damage during the entrance. Visualization did show  the fundus of the gallbladder visualized. Under visualization with the  laparoscope, three additional robotic trocars were placed in the lower  abdomen just below the level of the umbilicus to the left and right of  midline and in the right lower quadrant. The patient was then  repositioned with head up and rolled to the left side. Robot was docked  and targeted to right upper quadrant. Working instruments of Force  bipolar hook cautery and Cadiere grasper were placed. Cadiere was used  to grasp the fundus of the gallbladder which was retracted cephalad. This did provide for good visualization of the gallbladder. The  infundibulum was retracted inferior and lateral and Firefly was  activated. At this point, I could clearly see both the cystic duct and  its takeoff from the common duct. The artery was also fairly easily  visualized on initial inspection. I began to dissect in the area of the  cystic duct and cystic artery circumferentially dissecting both  structures in the process of clearing the hepatic plate partially. Critical view of safety was obtained. Once this was done, the cystic  duct and cystic artery were both clipped, each with two locking clips. Each structure was divided sharply between the two clips respectively. I then began to dissect the gallbladder away from the liver.   During  this time, good hemostasis was maintained in the liver bed.  Once the  gallbladder was dissected free, final inspection showed good hemostasis  and clips still in good placement. Cadiere grasper was removed and  EndoCatch bag was brought into the left upper quadrant port site. Gallbladder was placed into the bag which was closed. Final inspection  in the abdomen showed no obvious injuries to other structures. The  additional robotic instruments were removed and the robot was undocked. At this point, I scrubbed back into the case and under visualization  with the scope removed the EndoCatch bag through the left upper quadrant  port site. During this time, I did have to enlarge the incision both at  the skin as well as at the fascia level slightly to facilitate removal.   Once this was done, the incision was closed at the fascia level using a  Ayden-Jarrett needle with a 0 Vicryl suture in an interrupted fashion. This did provide for good approximation of the fascial tissue. The  additional robotic trocars were then uncapped to allow evacuation of CO2  from the abdomen and then also removed. All port sites were  anesthetized with additional local anesthetic and closed at the skin  level with 4-0 Monocryl in a subcuticular fashion. The skin was  cleansed and dried and skin glue was placed across all incisions. At  the conclusion of the procedure, all sponge, instrument, and needle  counts were correct. The patient was awoken from anesthesia and taken  to the PACU in stable condition.         Jessica Encarnacion    D: 05/13/2022 9:27:12       T: 05/13/2022 9:30:44     [email or web address]  Job#: [id number]     Doc#: [id number]    CC:  Primary Care

## 2022-05-16 LAB — SURGICAL PATHOLOGY REPORT: NORMAL

## 2022-05-16 NOTE — CARE COORDINATION
During post op phone call, patient stated that she was having some pain. Writer asked if she is taking her pain medication. She stated that she was taking \"a little bit but not much. \"  Writer encouraged her to take the medication if she needs it but that she could also take OTC pain meds as directed. Instructed to take stool softener with narcotic pain meds. Patient stated that she is using a heating pad. Writer instructed that she should not be using a heating pad on her incisions but ice would be very beneficial for pain and swelling. Patient states her \"upper incision\" is red. She states none of the rest of them are. She states there is a very small amount of clear drainage coming from the upper incision. States it is also slightly warm. Denies fever. Writer instructed her to call MD if it increases in redness or warmth or if the drainage turns a different color or has an odor. Patient voices understanding.

## 2022-05-26 ENCOUNTER — OFFICE VISIT (OUTPATIENT)
Dept: SURGERY | Age: 22
End: 2022-05-26

## 2022-05-26 VITALS
SYSTOLIC BLOOD PRESSURE: 124 MMHG | OXYGEN SATURATION: 98 % | DIASTOLIC BLOOD PRESSURE: 86 MMHG | WEIGHT: 205.4 LBS | TEMPERATURE: 99.5 F | HEART RATE: 110 BPM | BODY MASS INDEX: 40.33 KG/M2 | HEIGHT: 60 IN

## 2022-05-26 DIAGNOSIS — Z09 POSTOP CHECK: Primary | ICD-10-CM

## 2022-05-26 PROCEDURE — 99024 POSTOP FOLLOW-UP VISIT: CPT | Performed by: SURGERY

## 2022-05-26 NOTE — ASSESSMENT & PLAN NOTE
Patient doing well after surgery. We discussed her pathology and she voiced understanding. Have recommend that she continue activity restrictions for 4 weeks. I also again discussed with her the dietary things that may cause her some bloating and diarrhea especially soon after surgery and she voiced understanding. May return to work Monday with activity restrictions and then those restrictions are lifted 4 weeks after the date of the surgery. May follow-up as needed.

## 2022-05-26 NOTE — PROGRESS NOTES
Subjective   Dianna Workman is a 25 y.o. female who presents today for follow-up evaluation after robotic assisted laparoscopic cholecystectomy approximately a week ago. Since surgery the patient states that she has been doing well. Pain has been controlled. She does state that she still getting some twinges of pain at her left upper quadrant port site and also states that taking a deep breath will sometimes cause her a little bit of discomfort along the costal margin. No longer requiring pain medication. She is tolerating diet. Has had some issues with some bloating but it seems that she is eating a lot of fat containing dairy products as well as a lot of fattier meats which is leading to these issues. No emesis. Bowel movements are occurring and states that her bowel movements have been a little bit loose. Denies any incisional problems. No other complaints. No past medical history on file. Past Surgical History:   Procedure Laterality Date    CHOLECYSTECTOMY, LAPAROSCOPIC N/A 5/13/2022    Laparoscopic Robotic Assisted Cholecystestomy performed by Pati Kan DO at [facility] OR       Current Outpatient Medications   Medication Sig Dispense Refill    ondansetron (ZOFRAN) 4 MG tablet Take 1 tablet by mouth 3 times daily as needed for Nausea or Vomiting 15 tablet 0     No current facility-administered medications for this visit.        No Known Allergies    Family History   Problem Relation Age of Onset    Glaucoma Neg Hx        Social History     Socioeconomic History    Marital status: Single     Spouse name: Not on file    Number of children: Not on file    Years of education: Not on file    Highest education level: Not on file   Occupational History    Not on file   Tobacco Use    Smoking status: Never Smoker    Smokeless tobacco: Never Used   Vaping Use    Vaping Use: Never used   Substance and Sexual Activity    Alcohol use: Yes     Comment: occasionally    Drug use: Never    Sexual activity: Not on file   Other Topics Concern    Not on file   Social History Narrative    Not on file     Social Determinants of Health     Financial Resource Strain:     Difficulty of Paying Living Expenses: Not on file   Food Insecurity:     Worried About Running Out of Food in the Last Year: Not on file    Solomon of Food in the Last Year: Not on file   Transportation Needs:     Lack of Transportation (Medical): Not on file    Lack of Transportation (Non-Medical): Not on file   Physical Activity:     Days of Exercise per Week: Not on file    Minutes of Exercise per Session: Not on file   Stress:     Feeling of Stress : Not on file   Social Connections:     Frequency of Communication with Friends and Family: Not on file    Frequency of Social Gatherings with Friends and Family: Not on file    Attends Muslim Services: Not on file    Active Member of [address] [place] [address] [employer] or Organizations: Not on file    Attends Club or Organization Meetings: Not on file    Marital Status: Not on file   Intimate Partner Violence:     Fear of Current or Ex-Partner: Not on file    Emotionally Abused: Not on file    Physically Abused: Not on file    Sexually Abused: Not on file   Housing Stability:     Unable to Pay for Housing in the Last Year: Not on file    Number of [place] in the Last Year: Not on file    Unstable Housing in the Last Year: Not on file       ROS:   Review of Systems - Negative except As noted in HPI      Objective   Vitals:    05/26/22 1316   BP: 124/86   Pulse: (!) 110   Temp: 99.5 °F (37.5 °C)   SpO2: 98%     General:in no apparent distress and well developed and well nourished  Eyes: No gross abnormalities. Ears, Nose, Throat: hearing grossly normal bilaterally  Neck: neck supple and non tender without mass  Lungs: clear to auscultation without wheezes or rales   Heart: S1S2, no mumurs, RRR  Abdomen: Soft, nondistended, minimal tenderness palpation, bowel sounds present.   Incisions all intact and healing. No drainage, induration or erythema noted. Extremity: negative  Neuro: CN II-XII grossly intact      1. Postop check  Assessment & Plan:  Patient doing well after surgery. We discussed her pathology and she voiced understanding. Have recommend that she continue activity restrictions for 4 weeks. I also again discussed with her the dietary things that may cause her some bloating and diarrhea especially soon after surgery and she voiced understanding. May return to work Monday with activity restrictions and then those restrictions are lifted 4 weeks after the date of the surgery. May follow-up as needed.          (Please note that portions of this note were completed with a voice recognition program.  Efforts were made to edit the dictations but occasionally words are mis-transcribed.)

## 2022-06-09 ENCOUNTER — TELEPHONE (OUTPATIENT)
Dept: SURGERY | Age: 22
End: 2022-06-09

## 2022-06-09 NOTE — TELEPHONE ENCOUNTER
Per post-op note patient may be released to full duty starting 6/13/22. Patient confirms that she is ready to be off restrictions at that time. Letter written and left for patient pickup.

## 2022-06-09 NOTE — TELEPHONE ENCOUNTER
Patient called stating she had surgery with Dr Ronak Rose on 5/13/2022. She states she had a post op visit and wants to go back to work full duty 4 wk after surgery. She would like a RTW letter stating RTW with no restrictions on 6/13/2022.   Call [phone]

## 2022-06-25 PROBLEM — Z09 POSTOP CHECK: Status: RESOLVED | Noted: 2022-05-26 | Resolved: 2022-06-25

## 2022-10-17 NOTE — TELEPHONE ENCOUNTER
Pt up to toilet at this time.       Doris Glynn RN  10/16/22 7732 [facility]    Pre-Operative Evaluation/Consultation    Name:  Amador Sesay                                         Age:  25 y.o. MRN:  [id number]       :  2000   Date:  5/3/2022         Sex: female    There were no encounter diagnoses. Surgeon:  Dr. Marisol Melendez  Procedure (Planned):  Laparoscopic Robotic assisted Cholecystectomy  Date Scheduled surgery: 22    Attending : No att. providers found    Primary Physician:None  Cardiologist: None    Type of Anesthesia Requested: General    Patient Medical history:  No Known Allergies  Patient Active Problem List   Diagnosis    Myopia of both eyes with astigmatism    Symptomatic cholelithiasis     No past medical history on file. No past surgical history on file. Social History     Tobacco Use    Smoking status: Never Smoker    Smokeless tobacco: Never Used   Vaping Use    Vaping Use: Never used   Substance Use Topics    Alcohol use: Not Currently    Drug use: Not on file     Medications:  No current outpatient medications on file. No current facility-administered medications for this visit. Scheduled Meds:  Continuous Infusions:  PRN Meds:. Prior to Admission medications    Not on File     Vital Signs (Current) [unfilled]    Weight:   Wt Readings from Last 1 Encounters:   22 200 lb 3.2 oz (90.8 kg)     Height:   Ht Readings from Last 1 Encounters:   22 5' (1.524 m)      BMI:  There is no height or weight on file to calculate BMI. Estimated body mass index is 39.1 kg/m² as calculated from the following:    Height as of an earlier encounter on 5/3/22: 5' (1.524 m). Weight as of an earlier encounter on 5/3/22: 200 lb 3.2 oz (90.8 kg). body mass index is unknown because there is no height or weight on file. Cardiac Clearance: None   Medical Clearance:None   Appointment for surgery Clearance scheduled for:None     Preoperative Testing:   These are the current and completed labs:  CBC:   Lab Results Component Value Date    WBC 8.0 04/22/2022    RBC 4.53 04/22/2022    HGB 12.6 04/22/2022    HCT 37.3 04/22/2022    MCV 82.3 04/22/2022    RDW 13.1 04/22/2022     04/22/2022     CMP:   Lab Results   Component Value Date     04/22/2022    K 3.6 04/22/2022     04/22/2022    CO2 22 04/22/2022    BUN 12 04/22/2022    CREATININE 0.48 04/22/2022    GFRAA >60 04/22/2022    LABGLOM >60 04/22/2022    GLUCOSE 107 04/22/2022    PROT 7.7 04/22/2022    CALCIUM 9.1 04/22/2022    BILITOT 0.27 04/22/2022    ALKPHOS 70 04/22/2022    AST 20 04/22/2022    ALT <5 04/22/2022     POC Tests: No results for input(s): POCGLU, POCNA, POCK, POCCL, POCBUN, POCHEMO, POCHCT in the last 72 hours.   Coags  No results found for: PROTIME, INR, APTT  HCG (If Applicable)   Lab Results   Component Value Date    PREGTESTUR NEGATIVE 04/22/2022      ABGs No results found for: PHART, PO2ART, VNX8PTE, DFP4KRZ, BEART, P2LYNPKD   Type & Screen (If Applicable)  No results found for: Gerilyn Semen    Additional ordered pre-operative testing:  []CBC    []ABG      [] BMP   []URINALYSIS   []CMP    []HCG   []COAGS PT/INR  []T&C  []LFTs   []TYPE AND SCREEN    [] EKG  [] Chest X-Ray  [] Other Radiology    [] Sent to Hospitalist None  [x] Sent to Anesthesia for your review: None   [] Additional Orders: None     Comments:None   Requests: None    Signed: Boby Curiel LPN 1/5/6202 4:87 PM

## 2022-12-01 ENCOUNTER — HOSPITAL ENCOUNTER (EMERGENCY)
Age: 22
Discharge: HOME | End: 2022-12-01
Attending: EMERGENCY MEDICINE
Payer: MEDICAID

## 2022-12-01 VITALS
OXYGEN SATURATION: 99 % | WEIGHT: 195 LBS | TEMPERATURE: 97.8 F | DIASTOLIC BLOOD PRESSURE: 101 MMHG | SYSTOLIC BLOOD PRESSURE: 142 MMHG | RESPIRATION RATE: 17 BRPM | HEART RATE: 82 BPM | HEIGHT: 60 IN | BODY MASS INDEX: 38.28 KG/M2

## 2022-12-01 DIAGNOSIS — M25.511 BILATERAL SHOULDER PAIN, UNSPECIFIED CHRONICITY: Primary | ICD-10-CM

## 2022-12-01 DIAGNOSIS — M25.512 BILATERAL SHOULDER PAIN, UNSPECIFIED CHRONICITY: Primary | ICD-10-CM

## 2022-12-01 LAB
ABSOLUTE EOS #: 0.22 K/UL (ref 0–0.44)
ABSOLUTE IMMATURE GRANULOCYTE: <0.03 K/UL (ref 0–0.3)
ABSOLUTE LYMPH #: 2.44 K/UL (ref 1.1–3.7)
ABSOLUTE MONO #: 0.48 K/UL (ref 0.1–1.2)
ANION GAP SERPL CALCULATED.3IONS-SCNC: 12 MMOL/L (ref 9–17)
BASOPHILS # BLD: 0 % (ref 0–2)
BASOPHILS ABSOLUTE: 0.03 K/UL (ref 0–0.2)
BUN BLDV-MCNC: 13 MG/DL (ref 6–20)
BUN/CREAT BLD: 24 (ref 9–20)
CALCIUM SERPL-MCNC: 9.3 MG/DL (ref 8.6–10.4)
CHLORIDE BLD-SCNC: 101 MMOL/L (ref 98–107)
CO2: 25 MMOL/L (ref 20–31)
CREAT SERPL-MCNC: 0.55 MG/DL (ref 0.5–0.9)
D-DIMER QUANTITATIVE: 0.68 MG/L FEU (ref 0–0.59)
EOSINOPHILS RELATIVE PERCENT: 3 % (ref 1–4)
GFR SERPL CREATININE-BSD FRML MDRD: >60 ML/MIN/1.73M2
GLUCOSE BLD-MCNC: 91 MG/DL (ref 70–99)
HCG QUALITATIVE: NEGATIVE
HCT VFR BLD CALC: 39.2 % (ref 36.3–47.1)
HEMOGLOBIN: 13.1 G/DL (ref 11.9–15.1)
IMMATURE GRANULOCYTES: 0 %
LYMPHOCYTES # BLD: 31 % (ref 24–43)
MCH RBC QN AUTO: 27.6 PG (ref 25.2–33.5)
MCHC RBC AUTO-ENTMCNC: 33.4 G/DL (ref 25.2–33.5)
MCV RBC AUTO: 82.7 FL (ref 82.6–102.9)
MONOCYTES # BLD: 6 % (ref 3–12)
NRBC AUTOMATED: 0 PER 100 WBC
PDW BLD-RTO: 13.2 % (ref 11.8–14.4)
PLATELET # BLD: 274 K/UL (ref 138–453)
PMV BLD AUTO: 10.8 FL (ref 8.1–13.5)
POTASSIUM SERPL-SCNC: 3.9 MMOL/L (ref 3.7–5.3)
RBC # BLD: 4.74 M/UL (ref 3.95–5.11)
SEG NEUTROPHILS: 60 % (ref 36–65)
SEGMENTED NEUTROPHILS ABSOLUTE COUNT: 4.79 K/UL (ref 1.5–8.1)
SODIUM BLD-SCNC: 138 MMOL/L (ref 135–144)
TROPONIN, HIGH SENSITIVITY: <6 NG/L (ref 0–14)
WBC # BLD: 8 K/UL (ref 3.5–11.3)

## 2022-12-01 PROCEDURE — 96374 THER/PROPH/DIAG INJ IV PUSH: CPT

## 2022-12-01 PROCEDURE — 84484 ASSAY OF TROPONIN QUANT: CPT

## 2022-12-01 PROCEDURE — 93005 ELECTROCARDIOGRAM TRACING: CPT | Performed by: EMERGENCY MEDICINE

## 2022-12-01 PROCEDURE — 85379 FIBRIN DEGRADATION QUANT: CPT

## 2022-12-01 PROCEDURE — 80048 BASIC METABOLIC PNL TOTAL CA: CPT

## 2022-12-01 PROCEDURE — 99284 EMERGENCY DEPT VISIT MOD MDM: CPT

## 2022-12-01 PROCEDURE — 6360000002 HC RX W HCPCS: Performed by: EMERGENCY MEDICINE

## 2022-12-01 PROCEDURE — 84703 CHORIONIC GONADOTROPIN ASSAY: CPT

## 2022-12-01 PROCEDURE — 85025 COMPLETE CBC W/AUTO DIFF WBC: CPT

## 2022-12-01 RX ORDER — KETOROLAC TROMETHAMINE 30 MG/ML
30 INJECTION, SOLUTION INTRAMUSCULAR; INTRAVENOUS ONCE
Status: COMPLETED | OUTPATIENT
Start: 2022-12-01 | End: 2022-12-01

## 2022-12-01 RX ORDER — METOPROLOL SUCCINATE 25 MG/1
25 TABLET, EXTENDED RELEASE ORAL DAILY
COMMUNITY

## 2022-12-01 RX ADMIN — KETOROLAC TROMETHAMINE 30 MG: 30 INJECTION, SOLUTION INTRAMUSCULAR at 19:56

## 2022-12-01 ASSESSMENT — PAIN DESCRIPTION - ORIENTATION
ORIENTATION: RIGHT;LEFT
ORIENTATION: RIGHT;LEFT

## 2022-12-01 ASSESSMENT — PAIN - FUNCTIONAL ASSESSMENT: PAIN_FUNCTIONAL_ASSESSMENT: 0-10

## 2022-12-01 ASSESSMENT — PAIN DESCRIPTION - DESCRIPTORS
DESCRIPTORS: DISCOMFORT
DESCRIPTORS: ACHING;DISCOMFORT

## 2022-12-01 ASSESSMENT — PAIN DESCRIPTION - FREQUENCY: FREQUENCY: CONTINUOUS

## 2022-12-01 ASSESSMENT — PAIN SCALES - GENERAL
PAINLEVEL_OUTOF10: 6
PAINLEVEL_OUTOF10: 6

## 2022-12-01 ASSESSMENT — PAIN DESCRIPTION - LOCATION
LOCATION: ARM
LOCATION: SHOULDER;ARM

## 2022-12-02 LAB
EKG ATRIAL RATE: 88 BPM
EKG P AXIS: 23 DEGREES
EKG P-R INTERVAL: 154 MS
EKG Q-T INTERVAL: 392 MS
EKG QRS DURATION: 84 MS
EKG QTC CALCULATION (BAZETT): 474 MS
EKG R AXIS: 38 DEGREES
EKG T AXIS: 54 DEGREES
EKG VENTRICULAR RATE: 88 BPM

## 2022-12-02 NOTE — ED PROVIDER NOTES
eMERGENCY dEPARTMENT eNCOUnter      Pt Name: Viry Marina  MRN: [id number]  [place] 2000  Date of evaluation: 12/1/2022      CHIEF COMPLAINT       Chief Complaint   Patient presents with    Arm Pain     bilateral    Shoulder Pain         HISTORY OF PRESENT ILLNESS    Viry Marina is a 25 y.o. female who presents pain and shoulder pain. Patient states couple hours ago she started with some right shoulder pain, went across her chest and left shoulder pain, some tingling in her arm since her right shoulder is actually better but she still having the other symptoms she denies any nausea vomiting she denies any exacerbating relieving factors she denies any prior history of similar complains of some minor shortness of breath        REVIEW OF SYSTEMS       Review of systems are all reviewed and negative except stated above in HPI    [address]    has no past medical history on file. SURGICAL HISTORY      has a past surgical history that includes Cholecystectomy, laparoscopic (N/A, 5/13/2022). CURRENT MEDICATIONS       Previous Medications    METOPROLOL SUCCINATE (TOPROL XL) 25 MG EXTENDED RELEASE TABLET    Take 25 mg by mouth daily    ONDANSETRON (ZOFRAN) 4 MG TABLET    Take 1 tablet by mouth 3 times daily as needed for Nausea or Vomiting       ALLERGIES     has No Known Allergies. FAMILY HISTORY     has no family status information on file. family history is not on file. SOCIAL HISTORY      reports that she has never smoked. She has never used smokeless tobacco. She reports current alcohol use. She reports that she does not use drugs. PHYSICAL EXAM     INITIAL VITALS:  height is 5' (1.524 m) and weight is 195 lb (88.5 kg). Her tympanic temperature is 97.8 °F (36.6 °C). Her blood pressure is 142/101 (abnormal) and her pulse is 82. Her respiration is 17 and oxygen saturation is 99%.       General: Patient alert nontoxic-appearing female in no apparent distress  HEENT: Head is atraumatic conjunctive are clear pupils are equal reactive  Neck: Supple no meningismus no C-spine tenderness step-offs or deformities  Respiratory: Lung sounds are clear bilateral  Cardiac: Heart is regular rate and rhythm  Chest wall patient has no reproducible pain over the anterior chest wall  Extremity: Patient has good distal pulses intact sensation good cap refill good strength upper and lower extremities no focal deficits full range of motion with no tenderness    DIFFERENTIAL DIAGNOSIS/ MDM:     Atypical cardiac hypertension dissection muscular skeletal pain infection    DIAGNOSTIC RESULTS     EKG: All EKG's are interpreted by the Emergency Department Physician who either signs or Co-signs this chart in the absence of a cardiologist.    EKG interpreted by me reveals normal sinus rhythm rate 88 with no acute ST elevation depressions normal NE interval normal QS complex normal axis    RADIOLOGY:   I directly visualized the following  images and reviewed the radiologist interpretations:  No orders to display         LABS:  Labs Reviewed   BASIC METABOLIC PANEL - Abnormal; Notable for the following components:       Result Value    Bun/Cre Ratio 24 (*)     All other components within normal limits   D-DIMER, QUANTITATIVE - Abnormal; Notable for the following components:    D-Dimer, Quant 0.68 (*)     All other components within normal limits   CBC WITH AUTO DIFFERENTIAL   TROPONIN   HCG, SERUM, QUALITATIVE         EMERGENCY DEPARTMENT COURSE:   Vitals:    Vitals:    12/01/22 1910   BP: (!) 142/101   Pulse: 82   Resp: 17   Temp: 97.8 °F (36.6 °C)   TempSrc: Tympanic   SpO2: 99%   Weight: 195 lb (88.5 kg)   Height: 5' (1.524 m)     -------------------------  BP: (!) 142/101, Temp: 97.8 °F (36.6 °C), Heart Rate: 82, Resp: 17    Orders Placed This Encounter   Medications    ketorolac (TORADOL) injection 30 mg           Re-evaluation Notes    Patient is reevaluated she remains logical intact in no acute distress EKG is normal labs show an elevated D-dimer I did suggest further diagnostic studies such as CT chest to rule out PE and dissection she is refusing as she states she has had 2 CAT scans with dyes in the past and she is concerned about that she is refusing a simple plain x-ray at this time I did inform her I am unable to fully identify what her causes however I have low suspicion for cardiac as her EKG and her troponin is normal lung sounds are clear low suspicion for acute cardiac pulmonary vascular event follow-up as directed return if worse    CRITICAL CARE:   None      CONSULTS:      PROCEDURES:  None    FINAL IMPRESSION      1. Bilateral shoulder pain, unspecified chronicity          DISPOSITION/PLAN   DISPOSITION Decision To Discharge 12/01/2022 08:35:21 PM      Condition on Disposition    Stable    PATIENT REFERRED TO:  Roz Welch DO  [address] [facility]  [phone]    In 1 day        DISCHARGE MEDICATIONS:  New Prescriptions    No medications on file       (Please note that portions of this note were completed with a voice recognition program.  Efforts were made to edit the dictations but occasionally words are mis-transcribed.)    Terrell Chavez MD,, MD, F.A.C.E.P.   Attending Emergency Physician        Terrell Chavez MD  12/01/22 2041

## (undated) DEVICE — GARMENT,MEDLINE,DVT,INT,CALF,MED, GEN2: Brand: MEDLINE

## (undated) DEVICE — TUBING SET, SUCTION LAP, S-PILOT: Brand: N.A.

## (undated) DEVICE — SUTURE VCRL SZ 0 L27IN ABSRB VLT L26MM CT-2 1/2 CIR J334H

## (undated) DEVICE — BLADE ES ELASTOMERIC COAT INSUL DURABLE BEND UPTO 90DEG

## (undated) DEVICE — ANCHOR TISSUE RETRIEVAL SYSTEM, BAG SIZE 125 ML, PORT SIZE 8 MM: Brand: ANCHOR TISSUE RETRIEVAL SYSTEM

## (undated) DEVICE — GENERAL ROBOTIC PACK: Brand: MEDLINE INDUSTRIES, INC.

## (undated) DEVICE — INSUFFLATION TUBING SET, ENDOFLATOR 50: Brand: N.A.

## (undated) DEVICE — SOLUTION ANTIFOG VIS SYS CLEARIFY LAPSCP

## (undated) DEVICE — SOLUTION IV IRRIG POUR BRL 0.9% SODIUM CHL 2F7124

## (undated) DEVICE — SUTURE MCRYL SZ 4-0 L18IN ABSRB UD L19MM PS-2 3/8 CIR PRIM Y496G

## (undated) DEVICE — GLOVE SURG SZ 6 THK91MIL LTX FREE SYN POLYISOPRENE ANTI

## (undated) DEVICE — PACK SURG PROC REMINDER N WVN DISPOSABLE BEAC TIME OUT

## (undated) DEVICE — GLOVE ORANGE PI 7   MSG9070

## (undated) DEVICE — CLIP INT L POLYMER LOK LIG HEM O LOK

## (undated) DEVICE — SMOKE EVACUATION FILTER, SET WITH TUBE: Brand: N.A.

## (undated) DEVICE — PAD,ARMBOARD,CONV,FOAM,2X8X20",12PR/CS: Brand: MEDLINE

## (undated) DEVICE — ARM DRAPE

## (undated) DEVICE — BLANKET WRM W29.9XL79.1IN UP BODY FORC AIR MISTRAL-AIR

## (undated) DEVICE — Device

## (undated) DEVICE — SKIN AFFIX SURG ADHESIVE 72/CS 0.55ML: Brand: MEDLINE

## (undated) DEVICE — CANNULA SEAL

## (undated) DEVICE — BLADELESS OBTURATOR: Brand: WECK VISTA

## (undated) DEVICE — INSUFFLATION NEEDLE TO ESTABLISH PNEUMOPERITONEUM.: Brand: INSUFFLATION NEEDLE

## (undated) DEVICE — 4-PORT MANIFOLD: Brand: NEPTUNE 2